# Patient Record
(demographics unavailable — no encounter records)

---

## 2024-12-16 NOTE — PHYSICAL EXAM
[Soft] : abdomen soft [Non Tender] : non-tender [Normal Bowel Sounds] : normal bowel sounds [Obese] : obese [Alert and Oriented x3] : oriented to person, place, and time [No Acute Distress] : no acute distress [Well Nourished] : well nourished [Well Developed] : well developed [Normal Appearance] : was normal in appearance [Neck Supple] : was supple [No Respiratory Distress] : no respiratory distress  [Clear to Auscultation] : lungs were clear to auscultation bilaterally [Normal Rate] : normal rate  [Regular Rhythm] : with a regular rhythm [Normal S1, S2] : normal S1 and S2 [No Murmur] : no murmur heard [No Edema] : there was no peripheral edema [de-identified] : abdomen appears to contort to the left side, no palpable masses  [de-identified] : + confluent erythematous rash to lower abdomen/pelvic region

## 2024-12-16 NOTE — DATA REVIEWED
[FreeTextEntry1] : Blood work results 12/3/2024:  Glucose 115 A1C 6.7  Total cholesterol 201 HDL 61 Triglycerides 197  TSH 2.460  BUN 11 Creatinine 0.74 eGFR 89  AST 18 ALT 15  WBC 9.65 H/H 13.3/41 Platelets 210

## 2024-12-16 NOTE — ASSESSMENT
[FreeTextEntry1] : TIA - has followed up with cardiology, wearing Holter monitor - scheduled to see neurology - c/w ASA, Clopidogrel, Atorvastatin   Hyperlipidemia - c/w Atorvastatin, Zetia  - focus on diet   HTN - stable - c/w Amlodipine, Nadolol   Paroxysmal SVT, Atrial fibrillation - following with cardiology - wearing a Holter monitor - on Nadolol, ASA  DM II - stable, recent A1C 6.7 - off of GLP-1 Agonist due to constipation - consider trial of Metformin - focus on diet   Myotonic dystrophy - f/u with neurology  Osteoporosis - c/w management as per rheumatology - on Prolia   Abdominal wall swelling/contortion: likely related to her chronic back pain advised to discuss with her spine specialist consider CT abdomen/pelvis for further evaluation, to r/o hernia   Intertrigo: use Clotrimazole-Betamethasone cream as directed when rash has improved- can start Nystatin powder   HCM: up to date with blood work overdue for mammogram, referral given f/u with GYN

## 2024-12-16 NOTE — HISTORY OF PRESENT ILLNESS
[FreeTextEntry1] : Follow up  [de-identified] : 67 year old female presents for a follow up. She had a recent hospital admission to [facility] from 12/2/24- 12/3/24. She had found herself with an episode of slurred speech at home which had resolved, but then she found herself having some difficulty with speech in the sense that it was taking her a longer time to find the words she wanted to say. She went to the ER, was admitted for likely TIA. She had a work up with included imaging (CT and MRI of brain), blood work, echocardiogram and a bubble study. No acute findings were found. Her medications were adjusted- Rosuvastatin was d/c and she was started on both Atorvastatin and Zetia for her cholesterol. She was also started on Clopidogrel (in addition to her regular daily ASA). She had been on Nadolol from her cardiologist. She has followed up with her cardiologist (has h/o atrial fibrillation, paroxysmal SVT)- who has placed on her with a Holter monitor. She is scheduled to see a neurologist next month.   has h/o myotonic dystrophy type 1- asymptomatic   she had a recent urgent care visit for soreness to her tongue and roof of her mouth- diagnosed with oral thrush treated with Nystatin  soreness has improved- now with dry mouth   Has chronic back pain previous MRI showed compression fractures to L1 and L2 has had kypoplasties  on Tylenol with codeine for pain  follows with pain management   Diabetic, Obese off of GLP-1 Agonist due to constipation    following with rheumatology- Dr. Pretty for Osteoporosis on Prolia injections   c/o rash to her lower pelvic region   concerned about large bulge to the left side of her abdomen no associated pain

## 2025-01-06 NOTE — REVIEW OF SYSTEMS
[Wheezing] : wheezing [Cough] : cough [Dyspnea on Exertion] : dyspnea on exertion [Fever] : no fever [Chills] : no chills [Postnasal Drip] : postnasal drip [Chest Pain] : no chest pain

## 2025-01-06 NOTE — HEALTH RISK ASSESSMENT
[0] : 2) Feeling down, depressed, or hopeless: Not at all (0) [PHQ-2 Negative - No further assessment needed] : PHQ-2 Negative - No further assessment needed [Never] : Never [[id number]] : 0

## 2025-01-06 NOTE — HISTORY OF PRESENT ILLNESS
[FreeTextEntry8] : 67 year old female presents c/o persistent cough for the past 10 days or so. [facility] had [facility] a [facility], tested negative for COVID, prescribed Benzonatate and Ipratropium nasal spray to use. She has taken Benzonatate and used the nasal spray with mild relief. She went back to urgent care 3 days ago, tested negative for COVID again. She is here today as she is concerned about her cough- states it felt worse yesterday, developed rib pain from coughing. No fever.

## 2025-01-06 NOTE — ASSESSMENT
[FreeTextEntry1] : Cough - may have underlying bronchitis - c/w Benzonatate as directed when needed - trial of Symbicort inhaler, use as directed, advised to rinse mouth after use - trial of Albuterol inhaler, use as directed when needed - referred for chest x-ray to r/o pneumonia - nasal swab done for PCR testing to r/o COVID, Flu, RSV  DM II - stable, recent A1C 6.7 - c/w diet and exercise as tolerated  HTN - stable - c/w Amlodipine, Nadolol  Myotonic dystrophy - f/u with neurology when due

## 2025-01-06 NOTE — PHYSICAL EXAM
[No Acute Distress] : no acute distress [Well Nourished] : well nourished [Well Developed] : well developed [Normal Oropharynx] : the oropharynx was normal [Normal TMs] : both tympanic membranes were normal [Normal Appearance] : was normal in appearance [Neck Supple] : was supple [No Respiratory Distress] : no respiratory distress  [Clear to Auscultation] : lungs were clear to auscultation bilaterally [Normal Rate] : normal rate  [Regular Rhythm] : with a regular rhythm [Normal S1, S2] : normal S1 and S2 [No Murmur] : no murmur heard [Normal Anterior Cervical Nodes] : no anterior cervical lymphadenopathy [Alert and Oriented x3] : oriented to person, place, and time [de-identified] : postnasal drip present  [de-identified] : + cough

## 2025-03-17 NOTE — PHYSICAL EXAM
[No Acute Distress] : no acute distress [Well Nourished] : well nourished [Well Developed] : well developed [Normal Appearance] : was normal in appearance [Neck Supple] : was supple [No Respiratory Distress] : no respiratory distress  [Clear to Auscultation] : lungs were clear to auscultation bilaterally [Normal Rate] : normal rate  [Regular Rhythm] : with a regular rhythm [Normal S1, S2] : normal S1 and S2 [No Murmur] : no murmur heard [Soft] : abdomen soft [Obese] : obese [Normal Anterior Cervical Nodes] : no anterior cervical lymphadenopathy [Alert and Oriented x3] : oriented to person, place, and time [de-identified] :  + slight hypopigmented areas to roof of her mouth

## 2025-03-17 NOTE — HISTORY OF PRESENT ILLNESS
[FreeTextEntry1] : Follow up  [de-identified] : 67 year old female presents for a follow up.  Since her last office visit- she has followed up with cardiology and neurology regarding h/o TIA. She will be going for consultation to obtain a loop recorder. She continues to c/o oral thrush- not resolving, has tried Nystatin swish and swallow, Clotrimazole troches, and Nystatin tablets. She feels the thrush may have reoccurred as she had been using an inhaler. She continues to be concerned regarding a bulge to the left side of her abdomen- on associated pain.    has h/o myotonic dystrophy type 1- asymptomatic   Has chronic back pain previous MRI showed compression fractures to L1 and L2 has had kypoplasties  on Tylenol with codeine for pain  follows with pain management   Diabetic, Obese off of GLP-1 Agonist due to constipation   wants to go back on GLP-1 as she is concerned about her weight reports she has been having normal bowel movements as she takes an over the counter supplement to help   following with rheumatology- Dr. Pretty for Osteoporosis on Prolia injections   concerned about large bulge to the left side of her abdomen no associated pain

## 2025-04-11 NOTE — HISTORY OF PRESENT ILLNESS
[FreeTextEntry1] : Annual physical  [de-identified] : 67 year old female presents for an annual physical. She had a recent CT of her abdomen/pelvis- revealed   She has followed up with cardiology and neurology regarding h/o TIA. She has a loop recorder in place. She is scheduled for a stress test.   has h/o myotonic dystrophy type 1- asymptomatic  follows with neurology   Has chronic back pain previous MRI showed compression fractures to L1 and L2 has had kyphoplasty  on Tylenol with codeine for pain  follows with pain management   Diabetic, Obese off of GLP-1 Agonist due to constipation   wants to go back on GLP-1 as she is concerned about her weight- prescribed Mounjaro at her last office visit    following with rheumatology- Dr. Pretty for Osteoporosis on Prolia injections   had been concerned about large bulge to the left side of her abdomen no associated pain had recent CT abdomen/pelvis- did not reveal any hernia to the area

## 2025-04-11 NOTE — HEALTH RISK ASSESSMENT
[Monthly or less (1 pt)] : Monthly or less (1 point) [1 or 2 (0 pts)] : 1 or 2 (0 points) [Never (0 pts)] : Never (0 points) [No] : In the past 12 months have you used drugs other than those required for medical reasons? No [No falls in past year] : Patient reported no falls in the past year [None] : Patient does not have any barriers to medication adherence [Yes] : Reviewed medication list for presence of high-risk medications. [Benzodiazepines] : benzodiazepines [Opioids] : opioids [Never] : Never [Patient reported mammogram was normal] : Patient reported mammogram was normal [Patient reported PAP Smear was normal] : Patient reported PAP Smear was normal [With Significant Other] : lives with significant other [With Family] : lives with family [] :  [Fully functional (bathing, dressing, toileting, transferring, walking, feeding)] : Fully functional (bathing, dressing, toileting, transferring, walking, feeding) [Independent] : managing finances [Some assistance needed] : doing laundry [Smoke Detector] : smoke detector [Carbon Monoxide Detector] : carbon monoxide detector [Seat Belt] :  uses seat belt [Sunscreen] : uses sunscreen [With Patient/Caregiver] : , with patient/caregiver [de-identified] : rare alcohol use  [Audit-CScore] : 1 [de-identified] : Home exercises, exercise is limited from her back pain  [de-identified] : Diet needs improvement  [Change in mental status noted] : No change in mental status noted [Reports changes in hearing] : Reports no changes in hearing [Reports changes in vision] : Reports no changes in vision [MammogramDate] : 12/2019 [PapSmearDate] : 02/2020 [ColonoscopyDate] : 12/2017 [ColonoscopyComments] : Diverticulosis, Hemorrhoids, due 2027 [AdvancecareDate] : 04./2025

## 2025-07-11 NOTE — PLAN
[FreeTextEntry1] : Patient to follow up in 1 year for annual GYN exam ca-125/cbc obtained  Mammogram and bilateral breast US due: 5/26 Colonoscopy due: per GI  Bone density due: per   discussed in detail treatment for ovarian cyst  she would be a great candidate for LESS BSO  Risks and benefits discussed in detail. see scanned in image patient to follow up for final decision for surgery pending pre op sono  obtain medical clearance  Pap ordered Hemoccult ordered all of her questions regarding procedure were answered she is agreeable with plan    I Gauri Watt [facility] am scribing for the presence of Dr. Be the following sections HISTORY OF PRESENT ILLNESS, PAST MEDICAL/FAMILY/SOCIAL HISTORY; REVIEW OF SYSTEMS; VITAL SIGNS; PHYSICAL EXAM; DISPOSITION. I personally performed the services described in the documentation, reviewed the documentation recorded by the scribe in my presence and it accurately and completely records my words and actions.

## 2025-07-11 NOTE — PHYSICAL EXAM
[Appropriately responsive] : appropriately responsive [Alert] : alert [No Acute Distress] : no acute distress [No Lymphadenopathy] : no lymphadenopathy [Soft] : soft [Non-tender] : non-tender [Non-distended] : non-distended [No HSM] : No HSM [No Lesions] : no lesions [No Mass] : no mass [Oriented x3] : oriented x3 [Examination Of The Breasts] : a normal appearance [No Masses] : no breast masses were palpable [Labia Majora] : normal [Labia Minora] : normal [Normal] : normal [Uterine Adnexae] : normal [Normal rectal exam] : was normal [FreeTextEntry2] : Hamzah FNP-BC  [Occult Blood Positive] : was negative for occult blood analysis [FreeTextEntry6] : left adnexal fullness

## 2025-07-11 NOTE — PLAN
[FreeTextEntry1] : Patient to follow up in 1 year for annual GYN exam ca-125/cbc obtained  Mammogram and bilateral breast US due: 5/26 Colonoscopy due: per GI  Bone density due: per   discussed in detail treatment for ovarian cyst  she would be a great candidate for LESS BSO  Risks and benefits discussed in detail. see scanned in image patient to follow up for final decision for surgery pending pre op sono  obtain medical clearance  Pap ordered Hemoccult ordered all of her questions regarding procedure were answered she is agreeable with plan    I Gauri Watt [facility] am scribing for the presence of Dr. Be the following sections HISTORY OF PRESENT ILLNESS, PAST MEDICAL/FAMILY/SOCIAL HISTORY; REVIEW OF SYSTEMS; VITAL SIGNS; PHYSICAL EXAM; DISPOSITION. I personally performed the services described in the documentation, reviewed the documentation recorded by the scribe in my presence and it accurately and completely records my words and actions.

## 2025-07-11 NOTE — HISTORY OF PRESENT ILLNESS
[FreeTextEntry1] : Patient is a 69 yo female here today for initial visit for an annual visit. She denies any GYN complaints at this time.  hx of C/Sx3, HTN, gallbladder removal, myotonic dystrophy type I, DM, osteoporosis on prolia injections   3/2025 CT revealed 7.4cm left adnexal septated cystic lesion  5/2025 TVUS revealed 6.8cm left ovarian complex cystic lesion and EML 4mm

## 2025-07-11 NOTE — HISTORY OF PRESENT ILLNESS
[FreeTextEntry1] : Patient is a 67 yo female here today for initial visit for an annual visit. She denies any GYN complaints at this time.  hx of C/Sx3, HTN, gallbladder removal, myotonic dystrophy type I, DM, osteoporosis on prolia injections   3/2025 CT revealed 7.4cm left adnexal septated cystic lesion  5/2025 TVUS revealed 6.8cm left ovarian complex cystic lesion and EML 4mm

## 2025-07-11 NOTE — REASON FOR VISIT
[Initial] : an initial consultation for [Spouse] : spouse [FreeTextEntry2] : annual and ovarian cyst

## 2025-07-22 NOTE — ASSESSMENT
[FreeTextEntry1] : Advised on risks and benefits of ear irrigation, patient agreed to proceed with procedure Attempted to irrigate right ear canal with combination of warm water and Hydrogen peroxide, bleeding noted to right ear canal after procedure, no further attempts made, advised to monitor for now and follow up with ENT, will look to have staff assist patient with scheduling an appointment as soon as possible

## 2025-07-22 NOTE — PHYSICAL EXAM
[No Acute Distress] : no acute distress [Well Nourished] : well nourished [Well Developed] : well developed [Well-Appearing] : well-appearing [Normal Appearance] : was normal in appearance [Neck Supple] : was supple [No Respiratory Distress] : no respiratory distress  [No Accessory Muscle Use] : no accessory muscle use [Alert and Oriented x3] : oriented to person, place, and time [de-identified] : right ear canal with some cerumen, right TM normal, left ear canal and left TM normal

## 2025-07-22 NOTE — HISTORY OF PRESENT ILLNESS
[FreeTextEntry8] : 68 year old female presents c/o right ear feeling clogged x 1 week. She denies any ear pain. No fever.